# Patient Record
Sex: MALE | Race: WHITE | URBAN - METROPOLITAN AREA
[De-identification: names, ages, dates, MRNs, and addresses within clinical notes are randomized per-mention and may not be internally consistent; named-entity substitution may affect disease eponyms.]

---

## 2018-09-04 ENCOUNTER — EVALUATION (OUTPATIENT)
Dept: PHYSICAL THERAPY | Facility: REHABILITATION | Age: 58
End: 2018-09-04
Payer: COMMERCIAL

## 2018-09-04 ENCOUNTER — TRANSCRIBE ORDERS (OUTPATIENT)
Dept: PHYSICAL THERAPY | Facility: REHABILITATION | Age: 58
End: 2018-09-04

## 2018-09-04 DIAGNOSIS — M54.2 CERVICALGIA: Primary | ICD-10-CM

## 2018-09-04 PROCEDURE — 97110 THERAPEUTIC EXERCISES: CPT | Performed by: PHYSICAL THERAPIST

## 2018-09-04 PROCEDURE — G8990 OTHER PT/OT CURRENT STATUS: HCPCS | Performed by: PHYSICAL THERAPIST

## 2018-09-04 PROCEDURE — 97162 PT EVAL MOD COMPLEX 30 MIN: CPT | Performed by: PHYSICAL THERAPIST

## 2018-09-04 PROCEDURE — G8991 OTHER PT/OT GOAL STATUS: HCPCS | Performed by: PHYSICAL THERAPIST
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
## 2018-09-05 ENCOUNTER — OFFICE VISIT (OUTPATIENT)
Dept: PHYSICAL THERAPY | Facility: REHABILITATION | Age: 58
End: 2018-09-05
Payer: COMMERCIAL

## 2018-09-05 DIAGNOSIS — M54.2 CERVICALGIA: Primary | ICD-10-CM

## 2018-09-05 PROCEDURE — 97140 MANUAL THERAPY 1/> REGIONS: CPT

## 2018-09-05 PROCEDURE — 97112 NEUROMUSCULAR REEDUCATION: CPT

## 2018-09-05 NOTE — PROGRESS NOTES
Daily Note     Today's date: 2018  Patient name: Latanya Monroy  : 1960  MRN: [id number]  Referring provider: Channing Blount  Dx:   Encounter Diagnosis     ICD-10-CM    1  Cervicalgia M54 2                   Subjective: Pt reported tightness in neck and HA's sometimes twice a day  R > L stiffness  Objective: See treatment diary below  Manual                        STM/GISTM c/s paraspinals/sub-occipitals f/b SOR  TK                                                                                                                           Exercise Diary                        UBE  85 rpm 3'/3'                     TB LPD gtb x15                     TB rows gtb x15                     pball posture  2'                     pball scaption  15                     SNAGS b/l rotation                       DNF                       Chin nods-supine  3"x10                     scap punches  3"x15                     C/s isometrics b/l rot   5"x10 ea                                                                                                                                                                                                                                                                           Modalities                        MH PRN  10'                                                                            Assessment: Tolerated treatment well  Patient demonstrated fatigue post treatment and exhibited good technique with therapeutic exercises  Good tolerance with initiation of session  Mild restrictions noted with GISTM  Relief with MH but noted feeling a little dizzy when sitting up  Plan: Continue per plan of care

## 2018-09-12 ENCOUNTER — OFFICE VISIT (OUTPATIENT)
Dept: PHYSICAL THERAPY | Facility: REHABILITATION | Age: 58
End: 2018-09-12
Payer: COMMERCIAL

## 2018-09-12 DIAGNOSIS — M54.2 CERVICALGIA: Primary | ICD-10-CM

## 2018-09-12 PROCEDURE — 97140 MANUAL THERAPY 1/> REGIONS: CPT

## 2018-09-12 PROCEDURE — 97112 NEUROMUSCULAR REEDUCATION: CPT

## 2018-09-12 NOTE — PROGRESS NOTES
Daily Note     Today's date: 2018  Patient name: John Moseley  : 1960  MRN: [id number]  Referring provider: Jasmeet Rothman  Dx:   Encounter Diagnosis     ICD-10-CM    1  Cervicalgia M54 2                   Subjective: Pt reported tightness directly in the back of his neck noting pain 1/10  Pt also notes that he felt pretty good post LV  Objective: See treatment diary below  Manual                      STM/GISTM c/s paraspinals/sub-occipitals f/b SOR  TK  MM                                                                                                                         Exercise Diary                      UBE  85 rpm 3'/3'  3'/3'                   TB LPD gtb x15  gtb x20                   TB rows gtb x15  gtb x20                   pball posture  2'  2'                   pball scaption  15  15                   SNAGS b/l rotation                       DNF                       Chin nods-supine  3"x10  3"x10                   scap punches  3"x15  3"x20                   C/s isometrics b/l rot   5"x10 ea  5"x10 ea                                                                                                                                                                                                                                                                         Modalities                      MH PRN  10'  5'                                                                          Assessment: Tolerated treatment well  Patient demonstrated fatigue post treatment and exhibited good technique with therapeutic exercises  Pt continues to demonstrate b/l cervical paraspinal tightness with R > L side  Continued with all TE this visit having no increased symptoms  Pt had decreased pain post session  Plan: Continue per plan of care

## 2018-09-14 ENCOUNTER — OFFICE VISIT (OUTPATIENT)
Dept: PHYSICAL THERAPY | Facility: REHABILITATION | Age: 58
End: 2018-09-14
Payer: COMMERCIAL

## 2018-09-14 DIAGNOSIS — M54.2 CERVICALGIA: Primary | ICD-10-CM

## 2018-09-14 PROCEDURE — 97140 MANUAL THERAPY 1/> REGIONS: CPT

## 2018-09-14 PROCEDURE — 97112 NEUROMUSCULAR REEDUCATION: CPT

## 2018-09-14 PROCEDURE — 97010 HOT OR COLD PACKS THERAPY: CPT

## 2018-09-14 NOTE — PROGRESS NOTES
Daily Note     Today's date: 2018  Patient name: Alfonso Chiu  : 1960  MRN: [id number]  Referring provider: Parker Larson  Dx:   Encounter Diagnosis     ICD-10-CM    1  Cervicalgia M54 2                   Subjective: Patient reported that she has tightness but no P! Reported  Good workout yesterday  Objective: See treatment diary below  Manual                    STM/GISTM c/s paraspinals/sub-occipitals f/b SOR  TK  MM                                                                                                                         Exercise Diary                    UBE  85 rpm 3'/3'  3'/3'  3' /3'                 TB LPD gtb x15  gtb x20  gtb x20                 TB rows gtb x15  gtb x20  gtb x20                 pball posture  2'  2'  2 min                 pball scaption  15  15  20x                 SNAGS b/l rotation      NT                 DNF      NT                 Chin nods-supine  3"x10  3"x10  3" x10                 scap punches  3"x15  3"x20  3" x20 1#                 C/s isometrics b/l rot   5"x10 ea  5"x10 ea  3" x10                                                                                                                                                                                                                                                                       Modalities                      MH PRN  10'  5'                                                                          Assessment: Tolerated treatment well  Patient demonstrated fatigue post treatment and exhibited good technique with therapeutic exercises  No increase in symptoms throughout treatment  Added 1# wt to scap punches no P! Or difficulty noted  MH post treatment to decrease tightness and stiffness in cervical area  Plan: Continue per plan of care

## 2018-09-18 ENCOUNTER — OFFICE VISIT (OUTPATIENT)
Dept: PHYSICAL THERAPY | Facility: REHABILITATION | Age: 58
End: 2018-09-18
Payer: COMMERCIAL

## 2018-09-18 DIAGNOSIS — M54.2 CERVICALGIA: Primary | ICD-10-CM

## 2018-09-18 PROCEDURE — 97140 MANUAL THERAPY 1/> REGIONS: CPT | Performed by: PHYSICAL THERAPIST

## 2018-09-18 PROCEDURE — 97112 NEUROMUSCULAR REEDUCATION: CPT | Performed by: PHYSICAL THERAPIST

## 2018-09-18 NOTE — PROGRESS NOTES
Daily Note     Today's date: 2018  Patient name: Zehra Santos  : 1960  MRN: [id number]  Referring provider: Nehemiah Diez  Dx:   Encounter Diagnosis     ICD-10-CM    1  Cervicalgia M54 2            1on1 w/ PT and PTA entire session  Subjective: Pt reports some improvement since starting PT  No current c/o pain  Objective: See treatment diary below  Manual                  STM/GISTM c/s paraspinals/sub-occipitals f/b SOR  TK  MM    TP                                                                                                                     Exercise Diary                  UBE  85 rpm 3'/3'  3'/3'  3' /3'  85 rpm 3'/3'               TB LPD gtb x15  gtb x20  gtb x20  gtb x20               TB rows gtb x15  gtb x20  gtb x20  gtb x20               pball posture  2'  2'  2 min  2'               pball scaption  15  15  20x  x20               SNAGS b/l rotation      NT  5"x10               DNF      NT  5"x10               Chin nods-supine  3"x10  3"x10  3" x10  3"x10               scap punches  3"x15  3"x20  3" x20 1#  np               C/s isometrics b/l rot   5"x10 ea  5"x10 ea  3" x10  np                                                                                                                                                                                                                                                                     Modalities                    MH PRN  10'  5'  np                                                                       Assessment: Tolerated treatment well  Patient requires VC's for correct technique with TE's  Not all TE's performed due to pt's time restraints  Plan: Continue per plan of care

## 2018-09-20 ENCOUNTER — OFFICE VISIT (OUTPATIENT)
Dept: PHYSICAL THERAPY | Facility: REHABILITATION | Age: 58
End: 2018-09-20
Payer: COMMERCIAL

## 2018-09-20 DIAGNOSIS — M54.2 CERVICALGIA: Primary | ICD-10-CM

## 2018-09-20 PROCEDURE — 97112 NEUROMUSCULAR REEDUCATION: CPT | Performed by: PHYSICAL THERAPIST

## 2018-09-20 PROCEDURE — 97110 THERAPEUTIC EXERCISES: CPT | Performed by: PHYSICAL THERAPIST

## 2018-09-20 PROCEDURE — 97140 MANUAL THERAPY 1/> REGIONS: CPT | Performed by: PHYSICAL THERAPIST

## 2018-09-20 NOTE — PROGRESS NOTES
Daily Note     Today's date: 2018  Patient name: Neil Barthel  : 1960  MRN: [id number]  Referring provider: Julius Arredondo  Dx:   Encounter Diagnosis     ICD-10-CM    1  Cervicalgia M54 2            1on1 w/ PT         Subjective:less HA since starting PT  Tolerated ER strengthening well  Objective: See treatment diary below  Manual     9 20             STM/GISTM c/s paraspinals/sub-occipitals f/b SOR  TK  MM    TP  MJ                                                                                                                   Exercise Diary     9 20             UBE  85 rpm 3'/3'  3'/3'  3' /3'  85 rpm 3'/3'  ube 85 rpm 3/3              TB LPD gtb x15  gtb x20  gtb x20  gtb x20  gtb 30x              TB rows gtb x15  gtb x20  gtb x20  gtb x20 gtb 30              pball posture  2'  2'  2 min  2'  2min             pball scaption  15  15  20x  x20  25              SNAGS b/l rotation      NT  5"x10  5''x10             DNF      NT  5"x10  5;;x10             Chin nods-supine  3"x10  3"x10  3" x10  3"x10  3''x10              scap punches  3"x15  3"x20  3" x20 1#  np  np              C/s isometrics b/l rot   5"x10 ea  5"x10 ea  3" x10  np  np               B/L ER OTB          20                                                                                                                                                                                                                                           Modalities    9 20               MH PRN  10'  5'  np  10min                                                                      Assessment: Tolerated treatment well  Patient requires VC's for correct technique with TE's  Not all TE's performed due to pt's time restraints  Plan: Continue per plan of care

## 2018-09-24 ENCOUNTER — APPOINTMENT (OUTPATIENT)
Dept: PHYSICAL THERAPY | Facility: REHABILITATION | Age: 58
End: 2018-09-24
Payer: COMMERCIAL

## 2018-09-26 ENCOUNTER — APPOINTMENT (OUTPATIENT)
Dept: PHYSICAL THERAPY | Facility: REHABILITATION | Age: 58
End: 2018-09-26
Payer: COMMERCIAL

## 2018-09-27 ENCOUNTER — APPOINTMENT (OUTPATIENT)
Dept: PHYSICAL THERAPY | Facility: REHABILITATION | Age: 58
End: 2018-09-27
Payer: COMMERCIAL

## 2018-10-10 ENCOUNTER — OFFICE VISIT (OUTPATIENT)
Dept: PHYSICAL THERAPY | Facility: REHABILITATION | Age: 58
End: 2018-10-10
Payer: COMMERCIAL

## 2018-10-10 DIAGNOSIS — M54.2 CERVICALGIA: Primary | ICD-10-CM

## 2018-10-10 PROCEDURE — 97140 MANUAL THERAPY 1/> REGIONS: CPT

## 2018-10-10 PROCEDURE — 97112 NEUROMUSCULAR REEDUCATION: CPT

## 2018-10-10 NOTE — PROGRESS NOTES
Daily Note     Today's date: 10/10/2018  Patient name: Tea Xavier  : 1960  MRN: [id number]  Referring provider: Bethel Burrell  Dx:   Encounter Diagnosis     ICD-10-CM    1  Cervicalgia M54 2                   Subjective: Pt reported missing 2 5 weeks of PT due to family issues  He reported intermittent tightness; R > L         Objective: See treatment diary below  Manual     9 20  10/10           STM/GISTM c/s paraspinals/sub-occipitals f/b SOR  TK  MM    TP  MJ  TC                                                                                                                 Exercise Diary     9 20  10/10           UBE  85 rpm 3'/3'  3'/3'  3' /3'  85 rpm 3'/3'  ube 85 rpm 3/3  85 rpm 3'/3'           TB LPD gtb x15  gtb x20  gtb x20  gtb x20  gtb 30x   gtb x30           TB rows gtb x15  gtb x20  gtb x20  gtb x20 gtb 30  gtb x30           pball posture  2'  2'  2 min  2'  2min  2'           pball scaption  15  15  20x  x20  25   25           SNAGS b/l rotation      NT  5"x10  5''x10  5"x10           DNF      NT  5"x10  5;;x10  np           Chin nods-supine  3"x10  3"x10  3" x10  3"x10  3''x10  3"x10           scap punches  3"x15  3"x20  3" x20 1#  np  np   3"x20           C/s isometrics b/l rot   5"x10 ea  5"x10 ea  3" x10  np  np   np            B/L ER OTB          20  20                                                                                                                                                                                                                                         Modalities    9 20  10/10             MH PRN  10'  5'  np  10min   np                                                                    Assessment: Tolerated treatment well  Patient demonstrated fatigue post treatment and exhibited good technique with therapeutic exercises  VC's needed for correct technique throughout session   Mild restrictions noted with GISTM  Plan: Continue per plan of care

## 2018-10-12 ENCOUNTER — OFFICE VISIT (OUTPATIENT)
Dept: PHYSICAL THERAPY | Facility: REHABILITATION | Age: 58
End: 2018-10-12
Payer: COMMERCIAL

## 2018-10-12 DIAGNOSIS — M54.2 CERVICALGIA: Primary | ICD-10-CM

## 2018-10-12 PROCEDURE — 97110 THERAPEUTIC EXERCISES: CPT | Performed by: PHYSICAL THERAPIST

## 2018-10-12 PROCEDURE — 97140 MANUAL THERAPY 1/> REGIONS: CPT | Performed by: PHYSICAL THERAPIST

## 2018-10-12 NOTE — PROGRESS NOTES
Daily Note     Today's date: 10/12/2018  Patient name: Leonora Lucia  : 1960  MRN: [id number]  Referring provider: Phani Caldera  Dx:   Encounter Diagnosis     ICD-10-CM    1  Cervicalgia M54 2            Pt arrived 15 mins late, but was accommodated  1on1 [phone] and performed remaining TE's as part of IEP  Subjective: Pt reports increased sub occipital headaches last 2 nights, possibly from lifting a TV  Objective: See treatment diary below    Manual   9/5  9/12  9/14  9/18  9 20  10/10  10/12         STM/GISTM c/s paraspinals/sub-occipitals f/b SOR  TK  MM    TP  MJ  TC  TP                                                                                                               Exercise Diary   9/5  9/12  9/14  9/18  9 20  10/10  10/12         UBE  85 rpm 3'/3'  3'/3'  3' /3'  85 rpm 3'/3'  ube 85 rpm 3/3  85 rpm 3'/3'  85rpm 3'/3'         TB LPD gtb x15  gtb x20  gtb x20  gtb x20  gtb 30x   gtb x30  gtb x30         TB rows gtb x15  gtb x20  gtb x20  gtb x20 gtb 30  gtb x30  gtb x30         pball posture  2'  2'  2 min  2'  2min  2'  np         pball scaption  15  15  20x  x20  25   25  np         SNAGS b/l rotation      NT  5"x10  5''x10  5"x10  5"x10         DNF      NT  5"x10  5;;x10  np  np         Chin nods-supine  3"x10  3"x10  3" x10  3"x10  3''x10  3"x10  5"x10         scap punches  3"x15  3"x20  3" x20 1#  np  np   3"x20  3"x20         C/s isometrics b/l rot   5"x10 ea  5"x10 ea  3" x10  np  np   np  3"x10ea          B/L ER OTB          20  20  20                                                                                                                                                                                                                                       Modalities    9 20  10/10  10/12           MH PRN  10'  5'  np  10min   np  5'                                       Assessment: Tolerated treatment well   Patient continues to have moderate myofascial restrictions with GISTM  Plan: Continue per plan of care

## 2018-10-17 ENCOUNTER — OFFICE VISIT (OUTPATIENT)
Dept: PHYSICAL THERAPY | Facility: REHABILITATION | Age: 58
End: 2018-10-17
Payer: COMMERCIAL

## 2018-10-17 DIAGNOSIS — M54.2 CERVICALGIA: Primary | ICD-10-CM

## 2018-10-17 PROCEDURE — 97140 MANUAL THERAPY 1/> REGIONS: CPT | Performed by: PHYSICAL THERAPIST

## 2018-10-17 PROCEDURE — 97110 THERAPEUTIC EXERCISES: CPT | Performed by: PHYSICAL THERAPIST

## 2018-10-17 NOTE — PROGRESS NOTES
Daily Note     Today's date: 10/17/2018  Patient name: Tj Jenkins  : 1960  MRN: [id number]  Referring provider: Art Eaton  Dx:   Encounter Diagnosis     ICD-10-CM    1  Cervicalgia M54 2         Pt was 10 mins late, but accommodated  1on1 [id number] and performed remaining TE's as part of IEP  Subjective: Pt reports some "creaking" in his cervical spine this morning  Objective: See treatment diary below  Manual   9/5  9/12  9/14  9/18  9 20  10/10  10/12  10/17       STM/GISTM c/s paraspinals/sub-occipitals f/b SOR  TK  MM    TP  MJ  TC  TP  TP                                                                                                             Exercise Diary   9/5  9/12  9/14  9/18  9 20  10/10  10/12  10/17       UBE  85 rpm 3'/3'  3'/3'  3' /3'  85 rpm 3'/3'  ube 85 rpm 3/3  85 rpm 3'/3'  85rpm 3'/3'  np       TB LPD gtb x15  gtb x20  gtb x20  gtb x20  gtb 30x   gtb x30  gtb x30  gtb x30       TB rows gtb x15  gtb x20  gtb x20  gtb x20 gtb 30  gtb x30  gtb x30  gtb x30       pball posture  2'  2'  2 min  2'  2min  2'  np  np       pball scaption  15  15  20x  x20  25   25  np  np       SNAGS b/l rotation      NT  5"x10  5''x10  5"x10  5"x10  10"x10       DNF      NT  5"x10  5;;x10  np  np  np       Chin nods-supine  3"x10  3"x10  3" x10  3"x10  3''x10  3"x10  5"x10  5"x10       scap punches  3"x15  3"x20  3" x20 1#  np  np   3"x20  3"x20  3"x20       C/s isometrics b/l rot   5"x10 ea  5"x10 ea  3" x10  np  np   np  3"x10ea  3"x10ea        B/L ER OTB          20  20  20  gtb x20                                                                                                                                                                                                                                     Modalities    9 20  10/10  10/12  10/17          PRN  10'  5'  np  10min   np  5'  np               Assessment: Tolerated treatment well   Patient continues to require cueing for correct technique with TE performance  No significant pain with cervical AROM testing this session or with TE's  Plan: Continue per plan of care  Progress treatment as tolerated

## 2018-10-19 ENCOUNTER — OFFICE VISIT (OUTPATIENT)
Dept: PHYSICAL THERAPY | Facility: REHABILITATION | Age: 58
End: 2018-10-19
Payer: COMMERCIAL

## 2018-10-19 DIAGNOSIS — M54.2 CERVICALGIA: Primary | ICD-10-CM

## 2018-10-19 PROCEDURE — 97112 NEUROMUSCULAR REEDUCATION: CPT

## 2018-10-19 PROCEDURE — 97140 MANUAL THERAPY 1/> REGIONS: CPT

## 2018-10-19 PROCEDURE — 97010 HOT OR COLD PACKS THERAPY: CPT

## 2018-10-19 NOTE — PROGRESS NOTES
Daily Note     Today's date: 10/19/2018  Patient name: Kimo De La Rosa  : 1960  MRN: [id number]  Referring provider: Shalini Carlin  Dx:   Encounter Diagnosis     ICD-10-CM    1  Cervicalgia M54 2                   Subjective: Pt reports that neck has been feeling better overall      Objective: See treatment diary below      Manual    20  10/10  10/12  10/17  10/19     STM/GISTM c/s paraspinals/sub-occipitals f/b SOR  TK  MM    TP  MJ  TC  TP  TP  KP                                                                                                           Exercise Diary     9 20  10/10  10/12  10/17  10/19     UBE  85 rpm 3'/3'  3'/3'  3' /3'  85 rpm 3'/3'  ube 85 rpm 3/3  85 rpm 3'/3'  85rpm 3'/3'  np  3/3     TB LPD gtb x15  gtb x20  gtb x20  gtb x20  gtb 30x   gtb x30  gtb x30  gtb x30  gtb 30x     TB rows gtb x15  gtb x20  gtb x20  gtb x20 gtb 30  gtb x30  gtb x30  gtb x30  gtb 30x     pball posture  2'  2'  2 min  2'  2min  2'  np  np       pball scaption  15  15  20x  x20  25   25  np  np       SNAGS b/l rotation      NT  5"x10  5''x10  5"x10  5"x10  10"x10  10x10" ea     DNF      NT  5"x10  5;;x10  np  np  np       Chin nods-supine  3"x10  3"x10  3" x10  3"x10  3''x10  3"x10  5"x10  5"x10  5"x10     scap punches  3"x15  3"x20  3" x20 1#  np  np   3"x20  3"x20  3"x20  20x3"     C/s isometrics b/l rot   5"x10 ea  5"x10 ea  3" x10  np  np   np  3"x10ea  3"x10ea  3"x10ea      B/L ER OTB          20  20  20  gtb x20  gtb 20x                                                                                                                                                                                                                                   Modalities    9 20  10/10  10/12  10/17         MH PRN  10'  5'  np  10min   np  5'  np               Assessment: Tolerated treatment well  Patient would benefit from continued PT    Pt had tightness in paraspinals, R>L  Pt  able to complete all exercises with no increase in pain during or after session  Pt  1:1 with PTA [phone], IEP and heat for remainder  Plan: Continue per plan of care

## 2018-10-30 PROCEDURE — G8991 OTHER PT/OT GOAL STATUS: HCPCS | Performed by: PHYSICAL THERAPIST

## 2018-10-30 PROCEDURE — G8990 OTHER PT/OT CURRENT STATUS: HCPCS | Performed by: PHYSICAL THERAPIST

## 2018-11-19 NOTE — PROGRESS NOTES
Pt was progressing well and stopped attending PT  Pt has not been seen for PT in a month and will therefore be d/c at this time

## 2019-08-16 PROCEDURE — 88305 TISSUE EXAM BY PATHOLOGIST: CPT | Performed by: PATHOLOGY

## 2019-08-16 PROCEDURE — 88342 IMHCHEM/IMCYTCHM 1ST ANTB: CPT | Performed by: PATHOLOGY

## 2019-08-16 PROCEDURE — 88341 IMHCHEM/IMCYTCHM EA ADD ANTB: CPT | Performed by: PATHOLOGY

## 2019-08-17 ENCOUNTER — LAB REQUISITION (OUTPATIENT)
Dept: LAB | Facility: HOSPITAL | Age: 59
End: 2019-08-17
Payer: COMMERCIAL

## 2019-08-17 DIAGNOSIS — C44.42 SQUAMOUS CELL CARCINOMA OF SKIN OF SCALP AND NECK: ICD-10-CM

## 2021-09-09 ENCOUNTER — COMPLETE EYE EXAM (OUTPATIENT)
Dept: URBAN - METROPOLITAN AREA CLINIC 6 | Facility: CLINIC | Age: 61
End: 2021-09-09

## 2021-09-09 DIAGNOSIS — H25.813: ICD-10-CM

## 2021-09-09 DIAGNOSIS — H04.123: ICD-10-CM

## 2021-09-09 DIAGNOSIS — H35.363: ICD-10-CM

## 2021-09-09 PROCEDURE — G8427 DOCREV CUR MEDS BY ELIG CLIN: HCPCS

## 2021-09-09 PROCEDURE — 1036F TOBACCO NON-USER: CPT

## 2021-09-09 PROCEDURE — 92014 COMPRE OPH EXAM EST PT 1/>: CPT

## 2021-09-09 ASSESSMENT — VISUAL ACUITY
OS_SC: 20/25
OD_SC: 20/25
OU_CC: J1

## 2021-09-09 ASSESSMENT — TONOMETRY
OD_IOP_MMHG: 15
OS_IOP_MMHG: 13

## 2022-09-21 ENCOUNTER — COMPLETE EYE EXAM (OUTPATIENT)
Dept: URBAN - METROPOLITAN AREA CLINIC 6 | Facility: CLINIC | Age: 62
End: 2022-09-21

## 2022-09-21 DIAGNOSIS — H35.363: ICD-10-CM

## 2022-09-21 DIAGNOSIS — H25.813: ICD-10-CM

## 2022-09-21 DIAGNOSIS — H04.123: ICD-10-CM

## 2022-09-21 PROCEDURE — 92014 COMPRE OPH EXAM EST PT 1/>: CPT

## 2022-09-21 ASSESSMENT — VISUAL ACUITY
OS_SC: 20/25
OD_SC: 20/25-1
OU_CC: J1

## 2022-09-21 ASSESSMENT — TONOMETRY
OD_IOP_MMHG: 8
OS_IOP_MMHG: 10

## 2023-09-27 ENCOUNTER — COMPLETE EYE EXAM (OUTPATIENT)
Dept: URBAN - METROPOLITAN AREA CLINIC 6 | Facility: CLINIC | Age: 63
End: 2023-09-27

## 2023-09-27 DIAGNOSIS — H04.123: ICD-10-CM

## 2023-09-27 DIAGNOSIS — H35.363: ICD-10-CM

## 2023-09-27 DIAGNOSIS — H25.813: ICD-10-CM

## 2023-09-27 PROCEDURE — 92014 COMPRE OPH EXAM EST PT 1/>: CPT

## 2023-09-27 ASSESSMENT — TONOMETRY
OS_IOP_MMHG: 11
OD_IOP_MMHG: 13

## 2023-09-27 ASSESSMENT — VISUAL ACUITY
OS_SC: 20/25-2
OD_SC: 20/20

## 2024-05-24 ENCOUNTER — PROBLEM (OUTPATIENT)
Dept: URBAN - METROPOLITAN AREA CLINIC 6 | Facility: CLINIC | Age: 64
End: 2024-05-24

## 2024-05-24 DIAGNOSIS — H11.31: ICD-10-CM

## 2024-05-24 DIAGNOSIS — H10.021: ICD-10-CM

## 2024-05-24 PROCEDURE — 92014 COMPRE OPH EXAM EST PT 1/>: CPT

## 2024-05-24 ASSESSMENT — VISUAL ACUITY
OD_SC: 20/30+2
OS_SC: 20/25-2

## 2024-05-24 ASSESSMENT — TONOMETRY
OD_IOP_MMHG: 16
OD_IOP_MMHG: 15
OS_IOP_MMHG: 8

## 2024-07-08 ENCOUNTER — TELEPHONE (OUTPATIENT)
Age: 64
End: 2024-07-08

## 2024-07-09 ENCOUNTER — PREP FOR PROCEDURE (OUTPATIENT)
Dept: SURGERY | Facility: CLINIC | Age: 64
End: 2024-07-09

## 2024-07-09 ENCOUNTER — OFFICE VISIT (OUTPATIENT)
Dept: SURGERY | Facility: CLINIC | Age: 64
End: 2024-07-09
Payer: COMMERCIAL

## 2024-07-09 VITALS
WEIGHT: 130 LBS | HEIGHT: 65 IN | DIASTOLIC BLOOD PRESSURE: 65 MMHG | SYSTOLIC BLOOD PRESSURE: 103 MMHG | HEART RATE: 58 BPM | BODY MASS INDEX: 21.66 KG/M2

## 2024-07-09 DIAGNOSIS — K40.90 UNILATERAL INGUINAL HERNIA WITHOUT OBSTRUCTION OR GANGRENE: Primary | ICD-10-CM

## 2024-07-09 DIAGNOSIS — K40.20 BILATERAL INGUINAL HERNIA: Primary | ICD-10-CM

## 2024-07-09 DIAGNOSIS — K40.20 NON-RECURRENT BILATERAL INGUINAL HERNIA WITHOUT OBSTRUCTION OR GANGRENE: ICD-10-CM

## 2024-07-09 PROCEDURE — 99204 OFFICE O/P NEW MOD 45 MIN: CPT | Performed by: SURGERY

## 2024-07-09 RX ORDER — TAMSULOSIN HYDROCHLORIDE 0.4 MG/1
CAPSULE ORAL
COMMUNITY

## 2024-07-09 RX ORDER — SILODOSIN 8 MG/1
8 CAPSULE ORAL DAILY
COMMUNITY
Start: 2023-09-28 | End: 2024-09-27

## 2024-07-09 RX ORDER — OFLOXACIN 3 MG/ML
SOLUTION/ DROPS OPHTHALMIC
COMMUNITY
Start: 2024-05-24

## 2024-07-09 NOTE — PROGRESS NOTES
Assessment/Plan: Patient presents with a left inguinal hernia.  Is been present over the last 3 weeks.  He has intermittent discomfort.  It is not ever become incarcerated.  At this point he does desire repair.    At examination bilateral inguinal hernias are noted.  Left is larger than the right.  These are reducible.    We discussed open versus robotic inguinal hernia repair.  Pros and cons were reviewed.  Ultimately we decided upon open repair as it is less anesthesia, he is quite active with heavy lifting, and the recovery is not too extensive.  He is in agreement.  All questions answered.    There are no diagnoses linked to this encounter.    Subjective:      Patient ID: Scott Carey is a 64 y.o. male.    Patient presents for left inguinal hernia consult.  States he has had a bulge and intermittent discomfort, burning LLQ for 3 weeks.  Does not limit his activities.        The following portions of the patient's history were reviewed and updated as appropriate:     He  has a past medical history of Asthma.  He  has no past surgical history on file.  His family history is not on file.  He  has no history on file for tobacco use, alcohol use, and drug use.  Current Outpatient Medications   Medication Sig Dispense Refill    ofloxacin (OCUFLOX) 0.3 % ophthalmic solution       Silodosin 8 MG CAPS Take 8 mg by mouth daily      tamsulosin (Flomax) 0.4 mg Take by mouth       No current facility-administered medications for this visit.     He has No Known Allergies..    Review of Systems   Constitutional: Negative.  Negative for activity change.   HENT: Negative.     Eyes: Negative.    Respiratory: Negative.     Cardiovascular: Negative.    Gastrointestinal:  Positive for abdominal pain.   Endocrine: Negative.    Genitourinary: Negative.    Musculoskeletal: Negative.    Skin: Negative.    Allergic/Immunologic: Negative.    Neurological: Negative.    Psychiatric/Behavioral:  Negative for agitation, behavioral problems and  "confusion. The patient is not nervous/anxious.          Objective:      /65   Pulse 58   Ht 5' 5\" (1.651 m)   Wt 59 kg (130 lb)   BMI 21.63 kg/m²          Physical Exam  Constitutional:       Appearance: He is well-developed.   HENT:      Head: Atraumatic.   Neck:      Thyroid: No thyromegaly.      Trachea: No tracheal deviation.   Cardiovascular:      Rate and Rhythm: Regular rhythm.      Heart sounds: Normal heart sounds.   Pulmonary:      Effort: Pulmonary effort is normal.      Breath sounds: Normal breath sounds.   Abdominal:      Hernia: A hernia (Bilateral inguinal hernias.  These are reducible.) is present.   Musculoskeletal:      Right lower leg: No edema.      Left lower leg: No edema.   Skin:     General: Skin is warm and dry.   Neurological:      Mental Status: He is alert and oriented to person, place, and time.   Psychiatric:         Behavior: Behavior normal.         "

## 2024-07-25 ENCOUNTER — TELEPHONE (OUTPATIENT)
Age: 64
End: 2024-07-25

## 2024-09-03 ENCOUNTER — OFFICE VISIT (OUTPATIENT)
Dept: SURGERY | Facility: CLINIC | Age: 64
End: 2024-09-03
Payer: COMMERCIAL

## 2024-09-03 VITALS
HEIGHT: 65 IN | OXYGEN SATURATION: 86 % | HEART RATE: 77 BPM | WEIGHT: 130 LBS | BODY MASS INDEX: 21.66 KG/M2 | DIASTOLIC BLOOD PRESSURE: 69 MMHG | SYSTOLIC BLOOD PRESSURE: 109 MMHG

## 2024-09-03 DIAGNOSIS — K40.20 NON-RECURRENT BILATERAL INGUINAL HERNIA WITHOUT OBSTRUCTION OR GANGRENE: Primary | ICD-10-CM

## 2024-09-03 PROCEDURE — 99213 OFFICE O/P EST LOW 20 MIN: CPT | Performed by: SURGERY

## 2024-09-03 NOTE — PROGRESS NOTES
"Ambulatory Visit  Name: Scott Carey      : 1960      MRN: [id number]  Encounter Provider: Arias Carbone MD  Encounter Date: 9/3/2024   Encounter department: [facility]    Assessment & Plan   1. Non-recurrent bilateral inguinal hernia without obstruction or gangrene      History of Present Illness     Scott Carey is a 64 y.o. male who presents for pre op consult.  Scheduled for bilateral inguinal hernia repair 2024.  Bilateral inguinal hernias are noted.  He is due for open repair as he is active with heavy lifting.  Risks and benefits of surgery discussed.  All questions answered.    Review of Systems   Constitutional: Negative.  Negative for activity change.   HENT: Negative.     Eyes: Negative.    Respiratory: Negative.     Cardiovascular: Negative.    Gastrointestinal:  Positive for abdominal pain.   Endocrine: Negative.    Genitourinary: Negative.    Musculoskeletal: Negative.    Skin: Negative.    Allergic/Immunologic: Negative.    Neurological: Negative.    Psychiatric/Behavioral:  Negative for agitation, behavioral problems and confusion. The patient is not nervous/anxious.        Objective     /69   Pulse 77   Ht 5' 5\" (1.651 m)   Wt 59 kg (130 lb)   SpO2 (!) 86%   BMI 21.63 kg/m²     Physical Exam  Vitals and nursing note reviewed.   Constitutional:       General: He is not in acute distress.     Appearance: He is well-developed.   HENT:      Head: Normocephalic and atraumatic.   Eyes:      Conjunctiva/sclera: Conjunctivae normal.   Cardiovascular:      Rate and Rhythm: Normal rate and regular rhythm.      Heart sounds: No murmur heard.  Pulmonary:      Effort: Pulmonary effort is normal. No respiratory distress.      Breath sounds: Normal breath sounds.   Abdominal:      Palpations: Abdomen is soft.      Tenderness: There is no abdominal tenderness.      Hernia: A hernia (Bilateral inguinal hernias.  These are reducible.) is present.   Musculoskeletal:  "        General: No swelling.      Cervical back: Neck supple.   Skin:     General: Skin is warm and dry.      Capillary Refill: Capillary refill takes less than 2 seconds.   Neurological:      Mental Status: He is alert.   Psychiatric:         Mood and Affect: Mood normal.       Administrative Statements

## 2024-09-05 ENCOUNTER — TELEPHONE (OUTPATIENT)
Age: 64
End: 2024-09-05

## 2024-09-05 ENCOUNTER — ANESTHESIA EVENT (OUTPATIENT)
Dept: PERIOP | Facility: AMBULARY SURGERY CENTER | Age: 64
End: 2024-09-05
Payer: COMMERCIAL

## 2024-09-05 NOTE — TELEPHONE ENCOUNTER
Patient went to his PCP for pre-op appointment but did not bring clearance form. Please fax to [phone]. Thank you.

## 2024-09-13 ENCOUNTER — APPOINTMENT (OUTPATIENT)
Dept: LAB | Age: 64
End: 2024-09-13
Payer: COMMERCIAL

## 2024-09-13 DIAGNOSIS — Z12.5 SPECIAL SCREENING FOR MALIGNANT NEOPLASM OF PROSTATE: ICD-10-CM

## 2024-09-13 DIAGNOSIS — K40.20 BILATERAL INGUINAL HERNIA: ICD-10-CM

## 2024-09-13 DIAGNOSIS — Z00.00 ROUTINE GENERAL MEDICAL EXAMINATION AT A HEALTH CARE FACILITY: ICD-10-CM

## 2024-09-13 LAB
ALBUMIN SERPL BCG-MCNC: 4.1 G/DL (ref 3.5–5)
ALP SERPL-CCNC: 79 U/L (ref 34–104)
ALT SERPL W P-5'-P-CCNC: 22 U/L (ref 7–52)
ANION GAP SERPL CALCULATED.3IONS-SCNC: 7 MMOL/L (ref 4–13)
AST SERPL W P-5'-P-CCNC: 24 U/L (ref 13–39)
BILIRUB SERPL-MCNC: 1.46 MG/DL (ref 0.2–1)
BUN SERPL-MCNC: 27 MG/DL (ref 5–25)
CALCIUM SERPL-MCNC: 9.5 MG/DL (ref 8.4–10.2)
CHLORIDE SERPL-SCNC: 106 MMOL/L (ref 96–108)
CHOLEST SERPL-MCNC: 205 MG/DL
CO2 SERPL-SCNC: 28 MMOL/L (ref 21–32)
CREAT SERPL-MCNC: 0.87 MG/DL (ref 0.6–1.3)
ERYTHROCYTE [DISTWIDTH] IN BLOOD BY AUTOMATED COUNT: 12.2 % (ref 11.6–15.1)
GFR SERPL CREATININE-BSD FRML MDRD: 90 ML/MIN/1.73SQ M
GLUCOSE P FAST SERPL-MCNC: 102 MG/DL (ref 65–99)
HCT VFR BLD AUTO: 46.6 % (ref 36.5–49.3)
HDLC SERPL-MCNC: 53 MG/DL
HGB BLD-MCNC: 15.4 G/DL (ref 12–17)
LDLC SERPL CALC-MCNC: 133 MG/DL (ref 0–100)
MCH RBC QN AUTO: 30.4 PG (ref 26.8–34.3)
MCHC RBC AUTO-ENTMCNC: 33 G/DL (ref 31.4–37.4)
MCV RBC AUTO: 92 FL (ref 82–98)
NONHDLC SERPL-MCNC: 152 MG/DL
PLATELET # BLD AUTO: 241 THOUSANDS/UL (ref 149–390)
PMV BLD AUTO: 9.5 FL (ref 8.9–12.7)
POTASSIUM SERPL-SCNC: 5.3 MMOL/L (ref 3.5–5.3)
PROT SERPL-MCNC: 6.7 G/DL (ref 6.4–8.4)
PSA SERPL-MCNC: 0.79 NG/ML (ref 0–4)
RBC # BLD AUTO: 5.07 MILLION/UL (ref 3.88–5.62)
SODIUM SERPL-SCNC: 141 MMOL/L (ref 135–147)
TRIGL SERPL-MCNC: 94 MG/DL
WBC # BLD AUTO: 6.55 THOUSAND/UL (ref 4.31–10.16)

## 2024-09-13 PROCEDURE — 36415 COLL VENOUS BLD VENIPUNCTURE: CPT

## 2024-09-13 PROCEDURE — 84153 ASSAY OF PSA TOTAL: CPT

## 2024-09-13 PROCEDURE — 84403 ASSAY OF TOTAL TESTOSTERONE: CPT

## 2024-09-13 PROCEDURE — 84402 ASSAY OF FREE TESTOSTERONE: CPT

## 2024-09-13 PROCEDURE — 87086 URINE CULTURE/COLONY COUNT: CPT

## 2024-09-13 PROCEDURE — 80053 COMPREHEN METABOLIC PANEL: CPT

## 2024-09-13 PROCEDURE — 85027 COMPLETE CBC AUTOMATED: CPT

## 2024-09-13 PROCEDURE — 80061 LIPID PANEL: CPT

## 2024-09-14 LAB — BACTERIA UR CULT: NORMAL

## 2024-09-15 LAB
TESTOST FREE SERPL-MCNC: 6.9 PG/ML (ref 6.6–18.1)
TESTOST SERPL-MCNC: 345 NG/DL (ref 264–916)

## 2024-09-16 RX ORDER — DIPHENOXYLATE HYDROCHLORIDE AND ATROPINE SULFATE 2.5; .025 MG/1; MG/1
1 TABLET ORAL DAILY
COMMUNITY

## 2024-09-16 RX ORDER — OMEPRAZOLE 20 MG/1
20 TABLET, DELAYED RELEASE ORAL DAILY
COMMUNITY

## 2024-09-16 NOTE — PRE-PROCEDURE INSTRUCTIONS
Pre-Surgery Instructions:   Medication Instructions    multivitamin (THERAGRAN) TABS Stop taking 2 days prior to surgery.    omeprazole (PriLOSEC OTC) 20 MG tablet Take day of surgery.    Saw Palmetto, Serenoa repens, (SAW PALMETTO PO) Stop taking 2 days prior to surgery.    Silodosin 8 MG CAPS Take night before surgery    Medication instructions for day surgery reviewed. Please use only a sip of water to take your instructed medications. Avoid all over the counter vitamins, supplements and NSAIDS for one week prior to surgery per anesthesia guidelines. Tylenol is ok to take as needed.     You will receive a call one business day prior to surgery with an arrival time and hospital directions. If your surgery is scheduled on a Monday, the hospital will be calling you on the Friday prior to your surgery. If you have not heard from anyone by 8pm, please call the hospital supervisor through the hospital  at [phone]. ([place] [phone] or [place] [phone]).    Do not eat or drink anything after midnight the night before your surgery, including candy, mints, lifesavers, or chewing gum. Do not drink alcohol 24hrs before your surgery. Try not to smoke at least 24hrs before your surgery.       Follow the pre surgery showering instructions as listed in the “My Surgical Experience Booklet” or otherwise provided by your surgeon's office. Do not use a blade to shave the surgical area 1 week before surgery. It is okay to use a clean electric clippers up to 24 hours before surgery. Do not apply any lotions, creams, including makeup, cologne, deodorant, or perfumes after showering on the day of your surgery. Do not use dry shampoo, hair spray, hair gel, or any type of hair products.     No contact lenses, eye make-up, or artificial eyelashes. Remove nail polish, including gel polish, and any artificial, gel, or acrylic nails if possible. Remove all jewelry including rings and body piercing jewelry.     Wear  causal clothing that is easy to take on and off. Consider your type of surgery.    Keep any valuables, jewelry, piercings at home. Please bring any specially ordered equipment (sling, braces) if indicated.    Arrange for a responsible person to drive you to and from the hospital on the day of your surgery. Please confirm the visitor policy for the day of your procedure when you receive your phone call with an arrival time.     Call the surgeon's office with any new illnesses, exposures, or additional questions prior to surgery.    Please reference your “My Surgical Experience Booklet” for additional information to prepare for your upcoming surgery.

## 2024-09-19 ENCOUNTER — HOSPITAL ENCOUNTER (OUTPATIENT)
Facility: AMBULARY SURGERY CENTER | Age: 64
Setting detail: OUTPATIENT SURGERY
Discharge: HOME | End: 2024-09-19
Attending: SURGERY | Admitting: SURGERY
Payer: COMMERCIAL

## 2024-09-19 ENCOUNTER — ANESTHESIA (OUTPATIENT)
Dept: PERIOP | Facility: AMBULARY SURGERY CENTER | Age: 64
End: 2024-09-19
Payer: COMMERCIAL

## 2024-09-19 VITALS
RESPIRATION RATE: 18 BRPM | HEART RATE: 56 BPM | BODY MASS INDEX: 21.66 KG/M2 | TEMPERATURE: 97.2 F | DIASTOLIC BLOOD PRESSURE: 67 MMHG | HEIGHT: 65 IN | SYSTOLIC BLOOD PRESSURE: 138 MMHG | OXYGEN SATURATION: 100 % | WEIGHT: 130 LBS

## 2024-09-19 DIAGNOSIS — K40.20 NON-RECURRENT BILATERAL INGUINAL HERNIA WITHOUT OBSTRUCTION OR GANGRENE: Primary | ICD-10-CM

## 2024-09-19 PROCEDURE — NC001 PR NO CHARGE: Performed by: SURGERY

## 2024-09-19 PROCEDURE — 49505 PRP I/HERN INIT REDUC >5 YR: CPT | Performed by: SURGERY

## 2024-09-19 PROCEDURE — C1781 MESH (IMPLANTABLE): HCPCS | Performed by: SURGERY

## 2024-09-19 DEVICE — MODIFIED ONFLEX MESH, 3.4" X 5.6" (8.6 CM X 14.2 CM), MEDIUM
Type: IMPLANTABLE DEVICE | Site: GROIN | Status: FUNCTIONAL
Brand: ONFLEX

## 2024-09-19 RX ORDER — SODIUM CHLORIDE, SODIUM LACTATE, POTASSIUM CHLORIDE, CALCIUM CHLORIDE 600; 310; 30; 20 MG/100ML; MG/100ML; MG/100ML; MG/100ML
INJECTION, SOLUTION INTRAVENOUS CONTINUOUS PRN
Status: DISCONTINUED | OUTPATIENT
Start: 2024-09-19 | End: 2024-09-19

## 2024-09-19 RX ORDER — MAGNESIUM HYDROXIDE 1200 MG/15ML
LIQUID ORAL AS NEEDED
Status: DISCONTINUED | OUTPATIENT
Start: 2024-09-19 | End: 2024-09-19 | Stop reason: HOSPADM

## 2024-09-19 RX ORDER — KETOROLAC TROMETHAMINE 30 MG/ML
INJECTION, SOLUTION INTRAMUSCULAR; INTRAVENOUS AS NEEDED
Status: DISCONTINUED | OUTPATIENT
Start: 2024-09-19 | End: 2024-09-19

## 2024-09-19 RX ORDER — ONDANSETRON 2 MG/ML
INJECTION INTRAMUSCULAR; INTRAVENOUS AS NEEDED
Status: DISCONTINUED | OUTPATIENT
Start: 2024-09-19 | End: 2024-09-19

## 2024-09-19 RX ORDER — HYDROMORPHONE HCL/PF 1 MG/ML
0.5 SYRINGE (ML) INJECTION
Status: DISCONTINUED | OUTPATIENT
Start: 2024-09-19 | End: 2024-09-19 | Stop reason: HOSPADM

## 2024-09-19 RX ORDER — ONDANSETRON 2 MG/ML
4 INJECTION INTRAMUSCULAR; INTRAVENOUS EVERY 4 HOURS PRN
Status: DISCONTINUED | OUTPATIENT
Start: 2024-09-19 | End: 2024-09-19 | Stop reason: HOSPADM

## 2024-09-19 RX ORDER — MIDAZOLAM HYDROCHLORIDE 2 MG/2ML
INJECTION, SOLUTION INTRAMUSCULAR; INTRAVENOUS AS NEEDED
Status: DISCONTINUED | OUTPATIENT
Start: 2024-09-19 | End: 2024-09-19

## 2024-09-19 RX ORDER — EPHEDRINE SULFATE 50 MG/ML
INJECTION INTRAVENOUS AS NEEDED
Status: DISCONTINUED | OUTPATIENT
Start: 2024-09-19 | End: 2024-09-19

## 2024-09-19 RX ORDER — PROPOFOL 10 MG/ML
INJECTION, EMULSION INTRAVENOUS AS NEEDED
Status: DISCONTINUED | OUTPATIENT
Start: 2024-09-19 | End: 2024-09-19

## 2024-09-19 RX ORDER — LIDOCAINE HYDROCHLORIDE 20 MG/ML
INJECTION, SOLUTION EPIDURAL; INFILTRATION; INTRACAUDAL; PERINEURAL AS NEEDED
Status: DISCONTINUED | OUTPATIENT
Start: 2024-09-19 | End: 2024-09-19

## 2024-09-19 RX ORDER — OXYCODONE AND ACETAMINOPHEN 5; 325 MG/1; MG/1
1 TABLET ORAL EVERY 4 HOURS PRN
Qty: 10 TABLET | Refills: 0 | Status: SHIPPED | OUTPATIENT
Start: 2024-09-19

## 2024-09-19 RX ORDER — FUROSEMIDE 10 MG/ML
INJECTION INTRAMUSCULAR; INTRAVENOUS AS NEEDED
Status: DISCONTINUED | OUTPATIENT
Start: 2024-09-19 | End: 2024-09-19

## 2024-09-19 RX ORDER — OXYCODONE AND ACETAMINOPHEN 5; 325 MG/1; MG/1
1 TABLET ORAL EVERY 4 HOURS PRN
Status: DISCONTINUED | OUTPATIENT
Start: 2024-09-19 | End: 2024-09-19 | Stop reason: HOSPADM

## 2024-09-19 RX ORDER — FENTANYL CITRATE/PF 50 MCG/ML
25 SYRINGE (ML) INJECTION
Status: DISCONTINUED | OUTPATIENT
Start: 2024-09-19 | End: 2024-09-19 | Stop reason: HOSPADM

## 2024-09-19 RX ORDER — CEFAZOLIN SODIUM 1 G/3ML
INJECTION, POWDER, FOR SOLUTION INTRAMUSCULAR; INTRAVENOUS AS NEEDED
Status: DISCONTINUED | OUTPATIENT
Start: 2024-09-19 | End: 2024-09-19

## 2024-09-19 RX ORDER — ACETAMINOPHEN 325 MG/1
650 TABLET ORAL EVERY 4 HOURS PRN
Status: DISCONTINUED | OUTPATIENT
Start: 2024-09-19 | End: 2024-09-19 | Stop reason: HOSPADM

## 2024-09-19 RX ORDER — ONDANSETRON 2 MG/ML
4 INJECTION INTRAMUSCULAR; INTRAVENOUS ONCE AS NEEDED
Status: DISCONTINUED | OUTPATIENT
Start: 2024-09-19 | End: 2024-09-19 | Stop reason: HOSPADM

## 2024-09-19 RX ORDER — FENTANYL CITRATE 50 UG/ML
INJECTION, SOLUTION INTRAMUSCULAR; INTRAVENOUS AS NEEDED
Status: DISCONTINUED | OUTPATIENT
Start: 2024-09-19 | End: 2024-09-19

## 2024-09-19 RX ORDER — DEXAMETHASONE SODIUM PHOSPHATE 10 MG/ML
INJECTION, SOLUTION INTRAMUSCULAR; INTRAVENOUS AS NEEDED
Status: DISCONTINUED | OUTPATIENT
Start: 2024-09-19 | End: 2024-09-19

## 2024-09-19 RX ORDER — SODIUM CHLORIDE, SODIUM LACTATE, POTASSIUM CHLORIDE, CALCIUM CHLORIDE 600; 310; 30; 20 MG/100ML; MG/100ML; MG/100ML; MG/100ML
10 INJECTION, SOLUTION INTRAVENOUS CONTINUOUS
Status: DISCONTINUED | OUTPATIENT
Start: 2024-09-19 | End: 2024-09-19 | Stop reason: HOSPADM

## 2024-09-19 RX ADMIN — FENTANYL CITRATE 25 MCG: 50 INJECTION INTRAMUSCULAR; INTRAVENOUS at 09:34

## 2024-09-19 RX ADMIN — PROPOFOL 170 MG: 10 INJECTION, EMULSION INTRAVENOUS at 09:22

## 2024-09-19 RX ADMIN — LIDOCAINE HYDROCHLORIDE 100 MG: 20 INJECTION, SOLUTION EPIDURAL; INFILTRATION; INTRACAUDAL at 09:22

## 2024-09-19 RX ADMIN — CEFAZOLIN 1000 MG: 1 INJECTION, POWDER, FOR SOLUTION INTRAMUSCULAR; INTRAVENOUS at 09:28

## 2024-09-19 RX ADMIN — MIDAZOLAM 2 MG: 1 INJECTION INTRAMUSCULAR; INTRAVENOUS at 09:17

## 2024-09-19 RX ADMIN — FENTANYL CITRATE 25 MCG: 50 INJECTION INTRAMUSCULAR; INTRAVENOUS at 10:47

## 2024-09-19 RX ADMIN — DEXAMETHASONE SODIUM PHOSPHATE 10 MG: 10 INJECTION, SOLUTION INTRAMUSCULAR; INTRAVENOUS at 09:31

## 2024-09-19 RX ADMIN — FENTANYL CITRATE 25 MCG: 50 INJECTION INTRAMUSCULAR; INTRAVENOUS at 09:40

## 2024-09-19 RX ADMIN — FUROSEMIDE 10 MG: 10 INJECTION, SOLUTION INTRAMUSCULAR; INTRAVENOUS at 10:58

## 2024-09-19 RX ADMIN — SODIUM CHLORIDE, SODIUM LACTATE, POTASSIUM CHLORIDE, AND CALCIUM CHLORIDE: .6; .31; .03; .02 INJECTION, SOLUTION INTRAVENOUS at 07:45

## 2024-09-19 RX ADMIN — FENTANYL CITRATE 25 MCG: 50 INJECTION INTRAMUSCULAR; INTRAVENOUS at 11:03

## 2024-09-19 RX ADMIN — ONDANSETRON 4 MG: 2 INJECTION INTRAMUSCULAR; INTRAVENOUS at 09:31

## 2024-09-19 RX ADMIN — EPHEDRINE SULFATE 5 MG: 50 INJECTION INTRAVENOUS at 10:25

## 2024-09-19 RX ADMIN — KETOROLAC TROMETHAMINE 15 MG: 30 INJECTION, SOLUTION INTRAMUSCULAR; INTRAVENOUS at 10:58

## 2024-09-19 NOTE — H&P
"-General Surgical Care   Scott Carey 64 y.o. male MRN: [id number]  Unit/Bed#: [facility] Encounter: [id number]          ASSESSMENT: bilateral inguinal hernias     PLAN: bilateral inguinal hernias repair             Review of Systems  Constitutional:  Denies fever or chills   Eyes:  Denies change in visual acuity   HENT:  Denies nasal congestion or sore throat   Respiratory:  Denies cough or shortness of breath   Cardiovascular:  Denies chest pain or edema   GI:  Denies abdominal pain, nausea, vomiting, bloody stools or diarrhea   Musculoskeletal:  Denies back pain or joint pain   Integument:  Denies rash   Neurologic:  Denies headache, focal weakness or sensory changes   Endocrine:  Denies polyuria or polydipsia   Lymphatic:  Denies swollen glands   Psychiatric:  Denies depression or anxiety     Historical Information   Past Medical History:   Diagnosis Date    Asthma     GERD (gastroesophageal reflux disease)      Past Surgical History:   Procedure Laterality Date    COLONOSCOPY      WISDOM TOOTH EXTRACTION Bilateral      Social History   Social History     Substance and Sexual Activity   Alcohol Use Yes    Comment: socially     Social History     Substance and Sexual Activity   Drug Use Not Currently     Social History     Tobacco Use   Smoking Status Never   Smokeless Tobacco Never     History reviewed. No pertinent family history.    Meds/Allergies       Medications Prior to Admission:     multivitamin (THERAGRAN) TABS    omeprazole (PriLOSEC OTC) 20 MG tablet    Saw Palmetto, Serenoa repens, (SAW PALMETTO PO)    Silodosin 8 MG CAPS    ofloxacin (OCUFLOX) 0.3 % ophthalmic solution  No current facility-administered medications for this encounter.    Facility-Administered Medications Ordered in Other Encounters:     lactated ringers infusion, Continuous PRN    No Known Allergies    Objective     Blood pressure 131/74, pulse 82, temperature 97.5 °F (36.4 °C), temperature source Temporal, resp. rate 16, height 5' 5\" " (1.651 m), weight 59 kg (130 lb), SpO2 100%.    No intake or output data in the 24 hours ending 09/19/24 0913    PHYSICAL EXAM  General appearance: alert and oriented, in no acute distress  Lungs: clear to auscultation bilaterally  Heart: regular rate and rhythm, S1, S2 normal, no murmur, click, rub or gallop  Abdomen: soft, non-tender; bowel sounds normal; no masses,  no organomegaly  Skin: Skin color, texture, turgor normal. No rashes or lesions    Lab Results:   No visits with results within 1 Day(s) from this visit.   Latest known visit with results is:   Appointment on 09/13/2024   Component Date Value    WBC 09/13/2024 6.55     RBC 09/13/2024 5.07     Hemoglobin 09/13/2024 15.4     Hematocrit 09/13/2024 46.6     MCV 09/13/2024 92     MCH 09/13/2024 30.4     MCHC 09/13/2024 33.0     RDW 09/13/2024 12.2     Platelets 09/13/2024 241     MPV 09/13/2024 9.5     Sodium 09/13/2024 141     Potassium 09/13/2024 5.3     Chloride 09/13/2024 106     CO2 09/13/2024 28     ANION GAP 09/13/2024 7     BUN 09/13/2024 27 (H)     Creatinine 09/13/2024 0.87     Glucose, Fasting 09/13/2024 102 (H)     Calcium 09/13/2024 9.5     AST 09/13/2024 24     ALT 09/13/2024 22     Alkaline Phosphatase 09/13/2024 79     Total Protein 09/13/2024 6.7     Albumin 09/13/2024 4.1     Total Bilirubin 09/13/2024 1.46 (H)     eGFR 09/13/2024 90     Cholesterol 09/13/2024 205 (H)     Triglycerides 09/13/2024 94     HDL, Direct 09/13/2024 53     LDL Calculated 09/13/2024 133 (H)     Non-HDL-Chol (CHOL-HDL) 09/13/2024 152     PSA, Diagnostic 09/13/2024 0.794     Testosterone, Free 09/13/2024 6.9     TESTOSTERONE TOTAL 09/13/2024 345     Urine Culture 09/13/2024 <10,000 cfu/ml      Imaging Studies: No pertinent imaging studies reviewed.  No results found.     Counseling / Coordination of Care  Total time spent today  15 minutes. Greater than 50% of total time was spent with the patient and / or family counseling and / or coordination of care.

## 2024-09-19 NOTE — DISCHARGE INSTR - AVS FIRST PAGE
Please call the office when you leave to schedule an appointment for 2 weeks.              Please call [phone]. [address], [address], [place], [address]. [address] between [place] and [address].     Activity:    May lift 10 lb as many times as desired the 1st week,       20 lb in 2 weeks,       30 lb in 3 weeks.                Walking is encouraged  Normal daily activities including climbing steps are okay  Do not engage in strenuous activity ( sit-ups or crutches) or contact sports for 4-6 weeks post-operatively    Return to Work:   Okay to return to work when you feel well if you desire.        Diet:   You may return to your normal healthy diet.    Wound Care:  Your wound is closed with dissolvable stitches and glue.  It is okay to shower. Wash incision gently with soap and water and pat dry. Do not soak incisions in bath water or swim for two weeks. Do not apply any creams or ointments.    Pain Medication:   Please take as directed if needed. May use Advil or Motrin in addition.  Recall, the pain medicine and anesthesia is associated with constipation.    No driving while taking narcotic pain medications.      Other:  It is normal to developed a “healing ridge” / firm incision after surgery.  This is your body making scar tissue.  It is a good sign  Constipation is very common after general anesthesia.  Please use milk of magnesia as needed in order to help prevent constipation.  It is normal to get bruising after surgery.  If you have questions after discharge please call the office.    If you have increased pain, fever >101.5, increased drainage, redness or a bad smell at your surgery site, please call us immediately or come directly to the Emergency Room

## 2024-09-19 NOTE — OP NOTE
OPERATIVE REPORT  PATIENT NAME: Scott Carey    :  1960  MRN: [id number]  Pt Location: [facility] OR ROOM 05    SURGERY DATE: 2024    Surgeons and Role:     * Arias Carbone MD - Primary     * Yo Tompkins DO - Assisting    Preop Diagnosis:  Bilateral inguinal hernia [K40.20]    Post-Op Diagnosis Codes:     * Bilateral inguinal hernia [K40.20]    Procedure(s):  Bilateral - REPAIR HERNIA INGUINAL    Specimen(s):  * No specimens in log *    Estimated Blood Loss:   Minimal    Drains:  * No LDAs found *    Anesthesia Type:   General    Operative Indications:  Bilateral inguinal hernia [K40.20]      Operative Findings:        Complications:   None    Procedure and Technique:  Scott Carey is a 64 y.o. male was brought into the operative suite and identified visually and by arm band. The patient was placed in the supine position.  Careful attention was made towards padding and positioning of the extremities.  After sterile prep and drape, a timeout was completed. The prep was dry.  Antibiotics were provided. Athrombic pumps in place.    0.25% Marcaine with epinephrine was utilized throughout the procedure.  An incision was made  within the right  inguinal region.  The incision was carried down through the skin and subcutaneous tissue. The superficial epigastric vein was clamped, ligated and  divided. . The external oblique fibers were identified.  The external ring was identified.  The external oblique fibers were then split in the direction of their fibers.  Hemostats were placed on the cut edges.  A self-retaining retractor was then placed.    Exploration of the inguinal canal commenced.  The cremasteric fibers were then divided along the fiber direction of its fibers the cord structures.   The cord was encircled in a atraumatic Penrose drain and preserved during dissection.  Identification of a indirect and direct inguinal hernia was performed. High dissection was completed at the level of the internal  ring.  Preperitoneal dissection commenced identifying and preserving the inferior epigastric vessels.    A preperitoneal mesh was then inserted.  The branch of the genitofemoral nerve was divided in order to help prevent postoperative neuralgia.  The inguinal floor was then repaired with interrupted figure-of-eight 0 Vicryl suture.  The indirect inguinal ring was repositioned more superiorly while repairing the inguinal transversalis muscular floor.  An onlay mesh was then secured to the tendon overlying the lateral pubic tubercle The external oblique fascia was closed with a running 3-0 PDS suture.  Additional 0.25% Marcaine with epinephrine was injected over the ilioinguinal and iliohypogastric nerves.    3-0 Vicryl subcutaneous suture was placed into the Miah's fascia .   4-0 Monocryl suture was used for the subcuticular layer. Dermabond was then applied.    In a similar fashion the left inguinal area was also approached and repaired.  Incision was made through skin subcutaneous tissue.  External bleak fibers were divided.  Cord structures were encircled.  A large direct inguinal hernia was noted with a small indirect component.  Underlay an overlay meshes were then placed.  The transversalis floor was reconstructed.  External oblique was closed by 3-0 PDS suture followed by 3-0 Vicryl subcutaneous and 4 Monocryl sutures.  Dermabond was applied.  The patient tolerated this procedure well.  Sponge and instrument count correct x 2.    The patient was then awakened from general anesthesia and transferred to the recovery room in stable condition. Sponge and instrument count correct ×2.      I was present for the entire procedure.    Patient Disposition:  PACU              SIGNATURE: Arias Carbone MD  DATE: September 19, 2024  TIME: 11:18 AM

## 2024-09-19 NOTE — ANESTHESIA PREPROCEDURE EVALUATION
Medical History    History Comments   Asthma    GERD (gastroesophageal reflux disease)       Procedure:  REPAIR HERNIA INGUINAL (Bilateral: Groin)    Relevant Problems   ANESTHESIA (within normal limits)        Physical Exam    Airway    Mallampati score: II  TM Distance: >3 FB  Neck ROM: full     Dental       Cardiovascular  Rate: normal    Pulmonary  Pulmonary exam normal     Other Findings  Per pt denies anything remaining that is loose or removeable        Anesthesia Plan  ASA Score- 2     Anesthesia Type- general with ASA Monitors.         Additional Monitors:     Airway Plan: LMA.           Plan Factors-Exercise tolerance (METS): >4 METS.    Chart reviewed.    Patient summary reviewed.    Patient is not a current smoker.              Induction- intravenous.    Postoperative Plan- Plan for postoperative opioid use.         Informed Consent- Anesthetic plan and risks discussed with patient.  I personally reviewed this patient with the CRNA. Discussed and agreed on the Anesthesia Plan with the CRNA..

## 2024-09-19 NOTE — ANESTHESIA POSTPROCEDURE EVALUATION
Post-Op Assessment Note    CV Status:  Stable    Pain management: satisfactory to patient       Mental Status:  Alert, awake and sleepy   Hydration Status:  Euvolemic   PONV Controlled:  Controlled   Airway Patency:  Patent     Post Op Vitals Reviewed: Yes    No anethesia notable event occurred.    Staff: Anesthesiologist, CRNA               BP   123/63   Temp   97.7   Pulse  49   Resp   10   SpO2   98

## 2024-10-02 ENCOUNTER — ESTABLISHED COMPREHENSIVE EXAM (OUTPATIENT)
Dept: URBAN - METROPOLITAN AREA CLINIC 6 | Facility: CLINIC | Age: 64
End: 2024-10-02

## 2024-10-02 DIAGNOSIS — H04.123: ICD-10-CM

## 2024-10-02 DIAGNOSIS — H25.813: ICD-10-CM

## 2024-10-02 DIAGNOSIS — H35.363: ICD-10-CM

## 2024-10-02 PROCEDURE — 92014 COMPRE OPH EXAM EST PT 1/>: CPT

## 2024-10-02 PROCEDURE — 92134 CPTRZ OPH DX IMG PST SGM RTA: CPT

## 2024-10-02 ASSESSMENT — VISUAL ACUITY
OU_SC: J1
OS_SC: 20/25-1
OD_SC: 20/25

## 2024-10-02 ASSESSMENT — TONOMETRY
OD_IOP_MMHG: 13
OS_IOP_MMHG: 13

## 2024-10-07 ENCOUNTER — OFFICE VISIT (OUTPATIENT)
Dept: SURGERY | Facility: CLINIC | Age: 64
End: 2024-10-07

## 2024-10-07 DIAGNOSIS — K40.20 NON-RECURRENT BILATERAL INGUINAL HERNIA WITHOUT OBSTRUCTION OR GANGRENE: Primary | ICD-10-CM

## 2024-10-07 PROCEDURE — 99024 POSTOP FOLLOW-UP VISIT: CPT | Performed by: SURGERY

## 2024-10-07 NOTE — PROGRESS NOTES
Assessment/Plan: Patient is status post bilateral inguinal hernia repair.  He returns today for follow-up visit.  Overall he feels well.  He offers no complaints.  He is advance his activities nicely.  Incisions clean and healing well.  Activity instructions provided.    There are no diagnoses linked to this encounter.    Pathology: Reviewed with patient, all questions answered.       Postoperative restrictions reviewed. All questions answered.       ______________________________________________________  HPI: Patient presents post operatively.  Bilateral inguinal hernia repair 9/19/2024.               ROS:  General ROS: negative for - chills, fatigue, fever or night sweats, weight loss  Respiratory ROS: no cough, shortness of breath, or wheezing  Cardiovascular ROS: no chest pain or dyspnea on exertion  Genito-Urinary ROS: no dysuria, trouble voiding, or hematuria  Musculoskeletal ROS: negative for - gait disturbance, joint pain or muscle pain  Neurological ROS: no TIA or stroke symptoms  GI ROS: see HPI  Skin ROS: no new rashes or lesions   Lymphatic ROS: no new adenopathy noted by pt.   GYN ROS: see HPI, no new GYN history or bleeding noted  Psy ROS: no new mental or behavioral disturbances         Patient Active Problem List   Diagnosis    Unilateral inguinal hernia without obstruction or gangrene    Non-recurrent bilateral inguinal hernia without obstruction or gangrene       Allergies:  Patient has no known allergies.      Current Outpatient Medications:     multivitamin (THERAGRAN) TABS, Take 1 tablet by mouth daily, Disp: , Rfl:     ofloxacin (OCUFLOX) 0.3 % ophthalmic solution, , Disp: , Rfl:     omeprazole (PriLOSEC OTC) 20 MG tablet, Take 20 mg by mouth daily 10mg daily, Disp: , Rfl:     oxyCODONE-acetaminophen (PERCOCET) 5-325 mg per tablet, Take 1 tablet by mouth every 4 (four) hours as needed for moderate pain for up to 10 doses Max Daily Amount: 6 tablets, Disp: 10 tablet, Rfl: 0    Saw Palmetto,  Serenoa [facility]ens[facility] Take by mouth, Disp: , Rfl:     Silodosin 8 MG CAPS, Take 8 mg by mouth every evening, Disp: , Rfl:     Past Medical History:   Diagnosis Date    Asthma     Cancer (HCC) 8/1/2021    squamous cell    GERD (gastroesophageal reflux disease)        Past Surgical History:   Procedure Laterality Date    COLONOSCOPY      HERNIA REPAIR  9/19/2024    double inguinal    ME RPR 1ST INGUN HRNA AGE 4 YRS/> REDUCIBLE Bilateral 09/19/2024    Procedure: REPAIR HERNIA INGUINAL;  Surgeon: Arias Carbone MD;  Location: [facility];  Service: General    WISDOM TOOTH EXTRACTION Bilateral        Family History   Problem Relation Age of Onset    Cancer Father         Prostate    Cancer Maternal Uncle         Prostate        reports that he has never smoked. He has never used smokeless tobacco. He reports current alcohol use. He reports that he does not use drugs.    PHYSICAL EXAM    There were no vitals taken for this visit.    General: normal, cooperative, no distress  Abdominal: soft, nondistended, or nontender  Incision: clean, dry, and intact and healing well      Arias Carbone MD    Date: 10/7/2024 Time: 9:23 AM

## (undated) DEVICE — GLOVE SRG BIOGEL ECLIPSE 7

## (undated) DEVICE — UNDYED BRAIDED (POLYGLACTIN 910), SYNTHETIC ABSORBABLE SUTURE: Brand: COATED VICRYL

## (undated) DEVICE — TOWEL SURG XR DETECT GREEN STRL RFD

## (undated) DEVICE — ELECTRODE BLADE MOD E-Z CLEAN  2.75IN 7CM -0012AM

## (undated) DEVICE — INTENDED FOR TISSUE SEPARATION, AND OTHER PROCEDURES THAT REQUIRE A SHARP SURGICAL BLADE TO PUNCTURE OR CUT.: Brand: BARD-PARKER SAFETY BLADES SIZE 15, STERILE

## (undated) DEVICE — PENCIL ELECTROSURG E-Z CLEAN -0035H

## (undated) DEVICE — SUT VICRYL PLUS 2-0 54IN VCP286G

## (undated) DEVICE — SUT PDS II 3-0 SH 27 IN Z316H

## (undated) DEVICE — DECANTER: Brand: UNBRANDED

## (undated) DEVICE — SUT VICRYL 2-0 SH 27 IN UNDYED J417H

## (undated) DEVICE — BETHLEHEM UNIVERSAL MINOR GEN: Brand: CARDINAL HEALTH

## (undated) DEVICE — SUT VICRYL 1 CT-1 27 IN J261H

## (undated) DEVICE — CHLORAPREP HI-LITE 26ML ORANGE

## (undated) DEVICE — SUT VICRYL 3-0 SH 27 IN J416H

## (undated) DEVICE — EXOFIN PRECISION PEN HIGH VISCOSITY TOPICAL SKIN ADHESIVE: Brand: EXOFIN PRECISION PEN, 1G

## (undated) DEVICE — SUT MONOCRYL 4-0 PS-2 27 IN Y426H

## (undated) DEVICE — NEEDLE 20 G X 1 1/2

## (undated) RX ORDER — OFLOXACIN 3 MG/ML
1 SOLUTION OPHTHALMIC
Start: 2024-05-24